# Patient Record
Sex: MALE | Race: WHITE | Employment: FULL TIME | URBAN - METROPOLITAN AREA
[De-identification: names, ages, dates, MRNs, and addresses within clinical notes are randomized per-mention and may not be internally consistent; named-entity substitution may affect disease eponyms.]

---

## 2019-09-09 ENCOUNTER — TELEPHONE (OUTPATIENT)
Dept: PSYCHIATRY | Facility: CLINIC | Age: 24
End: 2019-09-09

## 2019-09-09 NOTE — TELEPHONE ENCOUNTER
PSYCHIATRY CLINIC PHONE INTAKE     SERVICES REQUESTED / INTERESTED IN          Med Management    Presenting Problem and Brief History                              What would you like to be seen for? (brief description):  The pt has struggled with social anxiety issues since high school that he has not been able to resolve on his own.   Have you received a mental health diagnosis? No     Is there any history of developmental delay?  No   Are you currently seeing a mental health provider?  No             Do you have mental health records elsewhere?  No    Have you ever been hospitalized for psychiatric reasons?  No      Do you have current thoughts of self-harm?  No    Do you currently have thoughts of harming others?  No       Substance Use History     Do you have any history of alcohol / illicit drug use?  No       Social History     Do you have any current or past legal issues?  No     OK to leave a detailed voicemail?  Yes    Medical/ Surgical History                                  None    Medications             None    DISPOSITION      Completed phone screen. No gender or NP/MD preference. Added to AGE wait list    -Eli Davis,

## 2019-12-17 ENCOUNTER — OFFICE VISIT (OUTPATIENT)
Dept: PSYCHIATRY | Facility: CLINIC | Age: 24
End: 2019-12-17
Attending: CLINICAL NURSE SPECIALIST
Payer: COMMERCIAL

## 2019-12-17 VITALS — WEIGHT: 160.8 LBS | SYSTOLIC BLOOD PRESSURE: 131 MMHG | HEART RATE: 90 BPM | DIASTOLIC BLOOD PRESSURE: 87 MMHG

## 2019-12-17 DIAGNOSIS — F40.10 SOCIAL ANXIETY DISORDER: ICD-10-CM

## 2019-12-17 DIAGNOSIS — F41.0 PANIC DISORDER WITHOUT AGORAPHOBIA: ICD-10-CM

## 2019-12-17 DIAGNOSIS — F41.1 GAD (GENERALIZED ANXIETY DISORDER): Primary | ICD-10-CM

## 2019-12-17 PROCEDURE — G0463 HOSPITAL OUTPT CLINIC VISIT: HCPCS | Mod: ZF

## 2019-12-17 RX ORDER — ESCITALOPRAM OXALATE 5 MG/1
TABLET ORAL
Qty: 60 TABLET | Refills: 1 | Status: SHIPPED | OUTPATIENT
Start: 2019-12-17 | End: 2020-01-28 | Stop reason: DRUGHIGH

## 2019-12-17 ASSESSMENT — PAIN SCALES - GENERAL: PAINLEVEL: NO PAIN (0)

## 2019-12-17 ASSESSMENT — PATIENT HEALTH QUESTIONNAIRE - PHQ9: SUM OF ALL RESPONSES TO PHQ QUESTIONS 1-9: 3

## 2019-12-17 NOTE — PROGRESS NOTES
"  Outpatient Psychiatry Diagnostic Assessment     Provider:  FELECIA Pérez 12/17/2019 10:43 AM  Patient Identification: Sheng Noble  YOB: 1995   MRN: [id number]  Sheng Noble is a 24 year old male  who presents for a 60 minute Diagnostic Assessment.   The patient s chief complaint is  anxiety.mood\"  Patient was referred by self.  Sheng is interviewed alone.  History of Present Illness        Sheng states he has been a life long sufferer of anxiety.  He finds that it is impacting his life negatively. It might be better than in college and high school at this time. Had more avoidant behaviors then but still a problem - going out in public by himself.  He has not sought help for this before.  Walking around he feels nervous and easily startled if he runs into someone whether he knows them or not.  He has noticed that if he is home and bumps his elbow on something then also starts easy.  Has always been self conscious, nervous about what people think about him.   He had a difficulty time when he was studying [employer] in [place] 6 years ago, his sophomore year of college.  The first 2 months went really well and he felt normal. He got sick and was not sure what the cause made him very anxious and since then has worsening. He found out afterward that he probably was sick from diesel fumes that had leaked under his room.  He was light headed, felt fogging, felt like vision was weird. He did have a panic attack then.  At work in group meetings after he is in the room for awhile he feels very anxious, nervous but not panicky.   Psychiatric Review of Systems:  Depression   PHQ-9 SCORE 12/17/2019   PHQ-9 Total Score 3     Anxiety : GUS - 7 score of 11. TONYA score of 8. Mild unable to relax, fear of worst happening, heart pounding or racing, shaky, fear of losing control, face flushed.  Moderate nervous.   Ghislaine: denies symptoms   Psychosis: denies  Gambling:denies  Eating disorder: " denies  Chemical use history is described in detail in a separate section.    Reports no suicidal thoughts.    Reports no violent ideation.    Current treatment resources include none.   Other support workers include none.    Sleep assessment: generally is good unless he stays up to late  Nutrition:no dietary restrictions, takes MVI    Contributing Medical Problems:  None  Past Psychiatric History      Previous providers:none  Past medication trials include none.   He has had no psychiatric hospitalizations.    Past psychotherapy trials include none.   ECT none  Has made no suicide attempts.    Has no history of self-injurious behavior.    no history of violent behavior.    Chemical Use History      CAGE -AID completed and scanned to chart Score of 0/4  Denies frequent use or abuse of alcohol. Occasionally on weekends might have some intoxication.   Cannabis denies current use   Other substance abuse:  Stimulants: none, Hallucinogens: none, Opiates: none  Caffeine one cup  Coffee in the am  Just started in the last few months  Nicotine: none  The patient has not had treatment for chemical dependence.     Past Medical/Surgical History      The patient s primary care provider is no current provider  Allergies are listed in the medical record.   Medications prescribed by other providers are as listed in the medical record.   The patient reports current physical symptoms including none.    The patient s chronic medical conditions are as listed in the medical record.    He reports no history of head injury.   He reports no history of loss of consciousness.   He reports no history of seizures.   He reports no history of other neurological concerns.      There is no problem list on file for this patient.    No current [facility]-ordered outpatient medications on file.     No current [facility]-ordered facility-administered medications on file.          Family History      The patient reports a family mental health treatment of none  diagnosed but notes that both parents may have anxiety   Family medical history includes No family history on file.  Maternal grandmother lung cancer, paternal cardiac ( all of the smoked).     Social History       The patient was raised [place] and moved to [place] in 8th grade.   Heterosexual.   Had one 4 month long relationship.   He has 2 younger brothers.  Parents  and living. Mother had previous marriage. Father  at [facility] and mother a .   Trauma history includes denies childhood or adult abuse or trauma.   The patient is single and has no children.    The patient s social support system includes family is close. Has a group of friends and lives with one of them.  He  lives with a roommate.    He  completed high school and did not participate in special education classes. Post high school education includes bachelors in finance and [place]. Played trumpet, cross country, track and basketball.   He is currently employed as  at [employer]. Attended [employer].    He has not had involvement with the legal system.   He has not served in the .   The patient reports the following spiritual and/or cultural history: raised Religion, doesn't go to Orthodox much, considers himself Orthodox.  Finances are none and basic needs are met.  Review of Systems     Pertinent items are noted in HPI.    Results      Pertinent findings on review include: Review of records Epic with relevant information reported in the HPI.    VS: /87   Pulse 90   Wt 72.9 kg (160 lb 12.8 oz)     Mental Status Exam   Alertness: alert  and oriented to person, place, time and situation  Appearance:  Casually dressed and Well groomed  Behavior/relationship to examiner/demeanor::cooperative, with some decreased eye contact   Speech: regular rate and rhythm  Mood : anxious  Affect: appropriate; was congruent to mood; was congruent to content  Thought Process  (Associations):  Logical and Goal directed  Thought process (Rate):  Normal  Thought content:  denies suicidal ideation, intent or thoughts, patient denies auditory and visual hallucinations, patient does not appear to be responding to internal stimuli, delusions none, No violent ideation and No homicidal ideation  Perception:  Normal form  Attention/Concentration:  Normal  Memory:  Immediate recall intact, Short-term memory intact and Long-term memory intact  Language: intact  Fund of Knowledge/Intelligence:  Average  Abstraction:  Normal  Insight:  Adequate  Judgment:  Good          Assessment & Plan     Assessment:   Sheng Noble is a 24 year old male  who presents for treatment of anxiety that meet criteria for GUS, social and panic without agoraphobia. Complete information on MINI.  Discussed that for best outcome both therapy and medication are recommended. He is open to therapy referrals but would prefer to start with medication.  Discussed considering sertraline or Lexapro and at this time agrees with plan to start Lexapro at 5mg since no previous medication trials. Will increase to 10mg after one week if no side effects.     Diagnosis  Encounter Diagnoses   Name Primary?     GUS (generalized anxiety disorder) Yes     Social anxiety disorder      Panic disorder without agoraphobia          Plan:    Medication: Lexapro 5mg for one week then increase to 10mg if no side effects  OTC Recommendations: none  Lab Orders:  None at this time  Referrals: will send list of clinics for therapy  Release of Information: none  Future Treatment Considerations: if side effects consider sertraline  Return for Follow Up: 6 weeks    The plan of care was reviewed and discussed with Sheng Noble.  This included risks,benefits, efficacy, dose, side effects and length of treatment. he agreed with the plan and verbalized understanding.  Patient was given phone number and contact information for the clinic and encouraged to  call if he has concerns prior to the follow up appointment.The patient understands to call 911 or come to the nearest ED if life threatening or urgent symptoms present. The patient understands the risks of using street drugs or alcohol.    Natacha IZQUIERDO CNS  12/17/2019

## 2020-01-28 ENCOUNTER — OFFICE VISIT (OUTPATIENT)
Dept: PSYCHIATRY | Facility: CLINIC | Age: 25
End: 2020-01-28
Attending: CLINICAL NURSE SPECIALIST
Payer: COMMERCIAL

## 2020-01-28 VITALS — DIASTOLIC BLOOD PRESSURE: 74 MMHG | HEART RATE: 92 BPM | SYSTOLIC BLOOD PRESSURE: 124 MMHG | WEIGHT: 165.2 LBS

## 2020-01-28 DIAGNOSIS — F41.1 GAD (GENERALIZED ANXIETY DISORDER): ICD-10-CM

## 2020-01-28 DIAGNOSIS — F41.0 PANIC DISORDER WITHOUT AGORAPHOBIA: ICD-10-CM

## 2020-01-28 DIAGNOSIS — F40.10 SOCIAL ANXIETY DISORDER: ICD-10-CM

## 2020-01-28 PROCEDURE — G0463 HOSPITAL OUTPT CLINIC VISIT: HCPCS | Mod: ZF

## 2020-01-28 RX ORDER — ESCITALOPRAM OXALATE 10 MG/1
10 TABLET ORAL DAILY
Qty: 30 TABLET | Refills: 1 | Status: SHIPPED | OUTPATIENT
Start: 2020-01-28 | End: 2020-03-10

## 2020-01-28 ASSESSMENT — PAIN SCALES - GENERAL: PAINLEVEL: NO PAIN (0)

## 2020-01-28 ASSESSMENT — PATIENT HEALTH QUESTIONNAIRE - PHQ9: SUM OF ALL RESPONSES TO PHQ QUESTIONS 1-9: 4

## 2020-01-28 NOTE — NURSING NOTE
Chief Complaint   Patient presents with     Recheck Medication     GUS (generalized anxiety disorder)

## 2020-01-28 NOTE — PROGRESS NOTES
Outpatient Psychiatry Progress Note     Provider: FELECIA Pérez CNS  Date: 2020  Service:  Medication follow up with counseling.   Patient Identification: Sheng Noble  : 1995   MRN: [id number]    Sheng Noble is a 25 year old year old male who presents for ongoing psychiatric care.  Sheng Noble was last seen in clinic on 19 for intake appointment.   At that time,   Assessment & Plan      Assessment:   Sheng Noble is a 24 year old male  who presents for treatment of anxiety that meet criteria for GUS, social and panic without agoraphobia. Complete information on MINI.  Discussed that for best outcome both therapy and medication are recommended. He is open to therapy referrals but would prefer to start with medication.  Discussed considering sertraline or Lexapro and at this time agrees with plan to start Lexapro at 5mg since no previous medication trials. Will increase to 10mg after one week if no side effects.      Diagnosis       Encounter Diagnoses   Name Primary?     GUS (generalized anxiety disorder) Yes     Social anxiety disorder       Panic disorder without agoraphobia              Plan:    Medication: Lexapro 5mg for one week then increase to 10mg if no side effects  OTC Recommendations: none  Lab Orders:  None at this time  Referrals: will send list of clinics for therapy  Release of Information: none  Future Treatment Considerations: if side effects consider sertraline  Return for Follow Up: 6 weeks      ____________________________________________________________________________________________________________________________________________    2020  Today Sheng reports he has noticed some moderate positive effect. He has  Noticed problems with waking up about 3 am and is restless but eventually will fall back to sleep eventually and during the next day he feels ok. Overall mood has improved and a little less judgmental. Hasn't helped the social  "anxiety as much.   Side effects of medication include: none known  Psychiatric Review of Systems:  Depression: In the last 2 weeks per PHQ-9 score:   PHQ-9 SCORE 12/17/2019 1/28/2020   PHQ-9 Total Score 3 4        Anxiety : continued social  Ghislaine na   Psychosis  na.   ADHD na    Review of Medical Systems:  Sleep: stable  Energy: stable  Concentration: stable  Appetite: stable  GI Concerns: none  Cardiac concerns: none  Neurological concerns: none  Other medical concerns: no new concerns  Current Substance Use:  Alcohol:denies frequent use or abuse  Other drugs:none  Caffeine:not reviewed  Nicotine: none  Past Medical History: No past medical history on file.  There is no problem list on file for this patient.      Allergies:   Allergies   Allergen Reactions     Erythromycin      As a baby       Minocycline      Hives            Current Medications     Current Outpatient Medications Ordered in Epic   Medication Sig Dispense Refill     escitalopram (LEXAPRO) 5 MG tablet Take one tablet by mouth daily for one week then increase to two tablets. May change to 10mg tablet if required by insurance 60 tablet 1     No current Epic-ordered facility-administered medications on file.         Mental Status Exam     Appearance:  Casually dressed and Well groomed  Behavior/relationship to examiner/demeanor:  Cooperative  Orientation: Oriented to person, place, time and situation  Psychomotor: normal form  Speech Rate:  Normal  Speech Spontaneity:  Normal  Mood:  \"okay\"  Affect:  Appropriate/mood-congruent  Thought Process (Associations):  Goal directed  Thought Content:  no overt psychosis, denies suicidal ideation, intent or thoughts and patient does not appear to be responding to internal stimuli  Abnormal Perception:  None  Attention/Concentration:  Normal  Insight:  Good  Judgment:  Good      Results     Vital signs: /74   Pulse 92   Wt 74.9 kg (165 lb 3.2 oz)     Laboratory Data:  no new data    Assessment & Plan "      Sheng Noble is seen today for follow up and reports he has noticed some improvement but also continued symptoms. Has not found therapist yet and discussed possible referrals. I will send other referral close to his home or work to consider that may be a good fit for him. For now continue current dose but will consider increase or change to sertraline if no further improvement.    Diagnosis  Encounter Diagnoses   Name Primary?     GUS (generalized anxiety disorder)      Social anxiety disorder      Panic disorder without agoraphobia        Plan:  Medication: Continue current medication of Lexapro 10mg. Just moved dose to daytime today  OTC Recommendations: none  Lab Orders:  none  Referrals: see My Chart message.  Release of Information: none  Future Treatment Considerations:per further time on Lexapro  Return for Follow Up:6 weeks   The risks, benefits, alternatives and side effects have been discussed and are understood by the patient. The patient understands the risks of using street drugs or alcohol. There are no medical contraindications, the patient agrees to treatment, and has the capacity to do so. The patient understands to call 911 or come to the nearest ED if life threatening or urgent symptoms present.  In addition time was spent counseling the patient and/or coordinating care regarding review of social and occupational functioning.  In addition patient was counseled on health and wellness practices to augment medication treatment of symptoms. See note for details.    Natacha Martinez, APRN CNS 1/28/2020

## 2020-02-08 DIAGNOSIS — F41.1 GAD (GENERALIZED ANXIETY DISORDER): ICD-10-CM

## 2020-02-08 DIAGNOSIS — F40.10 SOCIAL ANXIETY DISORDER: ICD-10-CM

## 2020-02-08 DIAGNOSIS — F41.0 PANIC DISORDER WITHOUT AGORAPHOBIA: ICD-10-CM

## 2020-02-10 RX ORDER — ESCITALOPRAM OXALATE 5 MG/1
TABLET ORAL
Qty: 60 TABLET | Refills: 1 | OUTPATIENT
Start: 2020-02-10

## 2020-03-10 ENCOUNTER — OFFICE VISIT (OUTPATIENT)
Dept: PSYCHIATRY | Facility: CLINIC | Age: 25
End: 2020-03-10
Attending: CLINICAL NURSE SPECIALIST
Payer: COMMERCIAL

## 2020-03-10 VITALS — DIASTOLIC BLOOD PRESSURE: 72 MMHG | SYSTOLIC BLOOD PRESSURE: 155 MMHG | HEART RATE: 96 BPM | WEIGHT: 172.2 LBS

## 2020-03-10 DIAGNOSIS — F41.0 PANIC DISORDER WITHOUT AGORAPHOBIA: ICD-10-CM

## 2020-03-10 DIAGNOSIS — F40.10 SOCIAL ANXIETY DISORDER: ICD-10-CM

## 2020-03-10 DIAGNOSIS — F41.1 GAD (GENERALIZED ANXIETY DISORDER): Primary | ICD-10-CM

## 2020-03-10 PROCEDURE — G0463 HOSPITAL OUTPT CLINIC VISIT: HCPCS | Mod: ZF

## 2020-03-10 RX ORDER — ESCITALOPRAM OXALATE 10 MG/1
10 TABLET ORAL DAILY
Qty: 90 TABLET | Refills: 1 | Status: SHIPPED | OUTPATIENT
Start: 2020-03-10

## 2020-03-10 RX ORDER — FINASTERIDE 1 MG/1
1 TABLET, FILM COATED ORAL DAILY
COMMUNITY

## 2020-03-10 ASSESSMENT — PATIENT HEALTH QUESTIONNAIRE - PHQ9: SUM OF ALL RESPONSES TO PHQ QUESTIONS 1-9: 0

## 2020-03-10 ASSESSMENT — PAIN SCALES - GENERAL: PAINLEVEL: NO PAIN (0)

## 2020-03-10 NOTE — PROGRESS NOTES
Outpatient Psychiatry Progress Note     Provider: FELECIA Pérez CNS  Date: 3/10/2020  Service:  Medication follow up with counseling.   Patient Identification: Sheng Noble  : 1995   MRN: [id number]    Sheng Noble is a 25 year old year old male who presents for ongoing psychiatric care.  Sheng Noble was last seen in clinic on 20.   At that time,   Assessment & Plan       Sheng Noble is seen today for follow up and reports he has noticed some improvement but also continued symptoms. Has not found therapist yet and discussed possible referrals. I will send other referral close to his home or work to consider that may be a good fit for him. For now continue current dose but will consider increase or change to sertraline if no further improvement.     Diagnosis       Encounter Diagnoses   Name Primary?     GUS (generalized anxiety disorder)       Social anxiety disorder       Panic disorder without agoraphobia           Plan:  Medication: Continue current medication of Lexapro 10mg. Just moved dose to daytime today  OTC Recommendations: none  Lab Orders:  none  Referrals: see My Chart message.  Release of Information: none  Future Treatment Considerations:per further time on Lexapro  Return for Follow Up:6 weeks      ____________________________________________________________________________________________________________________________________________    03/10/2020  Today Sheng reports in the last month has been less anxious and able to get into more of routine with working out. No longer having sleep problems  Side effects of medication include: none, initially noticed problems with waking up about 3 am and is restless but eventually will fall back to sleep eventually and during the next day he feels ok  Psychiatric Review of Systems:  Depression: In the last 2 weeks per PHQ-9 score:   PHQ-9 SCORE 2019 2020 3/10/2020   PHQ-9 Total Score 3 4 0        Anxiety :  "improved. Has times of feeling nervous but a lot easier to get through it  Ghislaine na   Psychosis  na.   ADHD na    Review of Medical Systems:  Sleep: improved   Energy: good  Concentration: good  Appetite: godd  GI Concerns: none  Cardiac concerns: none  Neurological concerns: none  Other medical concerns: none  Current Substance Use:  Alcohol:denies frequent use or abuse  Other drugs:none  Caffeine:not reviewed  Nicotine: none  Past Medical History: No past medical history on file.  There is no problem list on file for this patient.      Allergies:   Allergies   Allergen Reactions     Erythromycin      As a baby       Minocycline      Hives            Current Medications     Current Outpatient Medications Ordered in Epic   Medication Sig Dispense Refill     escitalopram (LEXAPRO) 10 MG tablet Take 1 tablet (10 mg) by mouth daily 30 tablet 1     finasteride (PROPECIA) 1 MG tablet Take 1 mg by mouth daily       No current [facility]-ordered facility-administered medications on file.         Mental Status Exam     Appearance:  Casually dressed and Well groomed  Behavior/relationship to examiner/demeanor:  Cooperative  Orientation: Oriented to person, place, time and situation  Psychomotor: normal form  Speech Rate:  Normal  Speech Spontaneity:  Normal  Mood:  \"good\"  Affect:  Appropriate/mood-congruent  Thought Process (Associations):  Goal directed  Thought Content:  no overt psychosis, denies suicidal ideation, intent or thoughts and patient does not appear to be responding to internal stimuli  Abnormal Perception:  None  Attention/Concentration:  Normal  Insight:  Good  Judgment:  Good      Results     Vital signs: BP (!) 155/72   Pulse 96   Wt 78.1 kg (172 lb 3.2 oz)     Laboratory Data:  no new results    Assessment & Plan      Sheng Noble is seen today for follow up and reports his mood has improved with less anxiety both general and social. He has not looked into therapy and at this time is feeling well enough " that he doesn't think it is needed.     Diagnosis  Encounter Diagnoses   Name Primary?     GUS (generalized anxiety disorder) Yes     Social anxiety disorder      Panic disorder without agoraphobia        Plan:  Medication: Continue current medication  OTC Recommendations: none  Lab Orders:  none  Referrals: Pepe Burleson here  Release of Information: none  Future Treatment Considerations:per symptoms consider increase in Lexapro or change to Sertraline  Return for Follow Up: 2 to 3 months   The risks, benefits, alternatives and side effects have been discussed and are understood by the patient. The patient understands the risks of using street drugs or alcohol. There are no medical contraindications, the patient agrees to treatment, and has the capacity to do so. The patient understands to call 911 or come to the nearest ED if life threatening or urgent symptoms present.  In addition time was spent counseling the patient and/or coordinating care regarding review of social and occupational functioning.  In addition patient was counseled on health and wellness practices to augment medication treatment of symptoms. See note for details.    Natacha Martinez, FELECIA CNS 3/10/2020

## 2020-03-11 ENCOUNTER — HEALTH MAINTENANCE LETTER (OUTPATIENT)
Age: 25
End: 2020-03-11

## 2021-01-03 ENCOUNTER — HEALTH MAINTENANCE LETTER (OUTPATIENT)
Age: 26
End: 2021-01-03

## 2021-04-25 ENCOUNTER — HEALTH MAINTENANCE LETTER (OUTPATIENT)
Age: 26
End: 2021-04-25

## 2021-10-10 ENCOUNTER — HEALTH MAINTENANCE LETTER (OUTPATIENT)
Age: 26
End: 2021-10-10

## 2022-05-22 ENCOUNTER — HEALTH MAINTENANCE LETTER (OUTPATIENT)
Age: 27
End: 2022-05-22

## 2022-09-18 ENCOUNTER — HEALTH MAINTENANCE LETTER (OUTPATIENT)
Age: 27
End: 2022-09-18

## 2023-06-04 ENCOUNTER — HEALTH MAINTENANCE LETTER (OUTPATIENT)
Age: 28
End: 2023-06-04